# Patient Record
Sex: FEMALE | Race: WHITE | NOT HISPANIC OR LATINO | URBAN - METROPOLITAN AREA
[De-identification: names, ages, dates, MRNs, and addresses within clinical notes are randomized per-mention and may not be internally consistent; named-entity substitution may affect disease eponyms.]

---

## 2021-12-28 ENCOUNTER — APPOINTMENT (RX ONLY)
Dept: URBAN - METROPOLITAN AREA CLINIC 126 | Facility: CLINIC | Age: 56
Setting detail: DERMATOLOGY
End: 2021-12-28

## 2021-12-28 DIAGNOSIS — Z41.9 ENCOUNTER FOR PROCEDURE FOR PURPOSES OTHER THAN REMEDYING HEALTH STATE, UNSPECIFIED: ICD-10-CM

## 2021-12-28 PROCEDURE — ? DERMAPLANE

## 2021-12-28 PROCEDURE — ? HYDRAFACIAL

## 2021-12-28 ASSESSMENT — LOCATION DETAILED DESCRIPTION DERM
LOCATION DETAILED: LEFT MEDIAL FOREHEAD
LOCATION DETAILED: RIGHT MEDIAL FOREHEAD

## 2021-12-28 ASSESSMENT — LOCATION ZONE DERM: LOCATION ZONE: FACE

## 2021-12-28 ASSESSMENT — LOCATION SIMPLE DESCRIPTION DERM
LOCATION SIMPLE: LEFT FOREHEAD
LOCATION SIMPLE: RIGHT FOREHEAD

## 2021-12-28 NOTE — PROCEDURE: DERMAPLANE
Post-Procedure Instructions: Following the dermaplane procedure, Oxymist treatment was applied to the treatment areas. Moisturizer and SPF was applied.
Blade: [place] scalpel
Treatment Area Prep: acetone
Post-Care Instructions: I reviewed with the patient in detail post-care instructions.
Treatment Areas: face and neck
Pre-Procedure Text: The patient was placed in a recumbant position on the procedure table.
Detail Level: Zone

## 2021-12-28 NOTE — PROCEDURE: HYDRAFACIAL
Procedure: Peel
Tip: Hydropeel Tip, Clear
Procedure: Exfoliation
Number Of Passes: 0
Location: face
Procedure: Extraction
Tip Override
Procedure: Fusion
Solution: GlySal 7.5%
Procedure: Boost
Solution: Activ-4
Procedure: Extend and Protect
Solution: Beta-HD
Indication: skin texture
Solution Override
Tip: Hydropeel Tip, Blue
Consent: Written consent obtained, risks reviewed including but not limited to crusting, scabbing, blistering, scarring, darker or lighter pigmentary change, bruising, and/or incomplete response.
Treatment Number: 1
Tip: Hydropeel Tip, Teal
Post-Care Instructions: I reviewed with the patient in detail post-care instructions. Patient should stay away from the sun and wear sun protection until treated areas are fully healed.

## 2022-01-05 ENCOUNTER — APPOINTMENT (RX ONLY)
Dept: URBAN - METROPOLITAN AREA CLINIC 126 | Facility: CLINIC | Age: 57
Setting detail: DERMATOLOGY
End: 2022-01-05

## 2022-01-05 DIAGNOSIS — Z41.9 ENCOUNTER FOR PROCEDURE FOR PURPOSES OTHER THAN REMEDYING HEALTH STATE, UNSPECIFIED: ICD-10-CM

## 2022-01-05 PROCEDURE — ? MEDICAL CONSULTATION: BOTOX

## 2022-01-05 PROCEDURE — ? MEDICAL CONSULTATION: BBL

## 2022-01-05 PROCEDURE — ? COSMETIC CONSULTATION: MICRONEEDLING

## 2022-01-05 PROCEDURE — ? COSMETIC CONSULTATION: SKIN CARE PRODUCTS AND SERVICES

## 2022-01-05 PROCEDURE — ? COSMETIC CONSULTATION: FILLERS

## 2022-01-05 ASSESSMENT — LOCATION SIMPLE DESCRIPTION DERM
LOCATION SIMPLE: RIGHT LIP
LOCATION SIMPLE: RIGHT CHEEK
LOCATION SIMPLE: GLABELLA
LOCATION SIMPLE: LEFT CHEEK

## 2022-01-05 ASSESSMENT — LOCATION DETAILED DESCRIPTION DERM
LOCATION DETAILED: LEFT INFERIOR MEDIAL MALAR CHEEK
LOCATION DETAILED: RIGHT INFERIOR CENTRAL MALAR CHEEK
LOCATION DETAILED: GLABELLA
LOCATION DETAILED: RIGHT SUPERIOR VERMILION LIP

## 2022-01-05 ASSESSMENT — LOCATION ZONE DERM
LOCATION ZONE: LIP
LOCATION ZONE: FACE

## 2022-01-11 ENCOUNTER — APPOINTMENT (RX ONLY)
Dept: URBAN - METROPOLITAN AREA CLINIC 126 | Facility: CLINIC | Age: 57
Setting detail: DERMATOLOGY
End: 2022-01-11

## 2022-01-11 DIAGNOSIS — Z41.9 ENCOUNTER FOR PROCEDURE FOR PURPOSES OTHER THAN REMEDYING HEALTH STATE, UNSPECIFIED: ICD-10-CM

## 2022-01-11 PROCEDURE — ? BOTOX

## 2022-01-11 NOTE — PROCEDURE: BOTOX
Additional Area 2 Location: parentheses
Anterior Platysmal Bands Units: 0
Expiration Date (Month Year): 8/2023
Show Additional Area 4: Yes
Additional Area 5 Location: upper lip lines
Consent: Written consent obtained. Risks include but not limited to lid/brow ptosis, bruising, swelling, diplopia, temporary effect, incomplete chemical denervation.
Lot #: [id number]
Forehead Units: 10
Glabellar Complex Units: 25
Additional Area 4 Location: [facility]
Show Right And Left Pupillary Line Units: No
Additional Area 1 Location: brow lift
Detail Level: Simple
Dilution (U/0.1 Cc): 1
Additional Area 6 Location: chin
Periorbital Skin Units: 24
Additional Area 3 Location: bunnies
Post-Care Instructions: Patient instructed to not lie down for 4 hours and limit physical activity for 24 hours. Patient instructed not to travel by airplane for 48 hours.

## 2022-01-26 ENCOUNTER — APPOINTMENT (RX ONLY)
Dept: URBAN - METROPOLITAN AREA CLINIC 126 | Facility: CLINIC | Age: 57
Setting detail: DERMATOLOGY
End: 2022-01-26

## 2022-01-26 DIAGNOSIS — Z41.9 ENCOUNTER FOR PROCEDURE FOR PURPOSES OTHER THAN REMEDYING HEALTH STATE, UNSPECIFIED: ICD-10-CM

## 2022-01-26 PROCEDURE — ? COSMETIC FOLLOW-UP

## 2022-01-26 NOTE — PROCEDURE: COSMETIC FOLLOW-UP
Treatment (Optional): Botox
Patient Satisfaction: Very pleased
Detail Level: Zone
Comments (Free Text): Patient is very happy with results.
Global Improvement: Marked
MINISTERIO Hoyos

## 2022-01-26 NOTE — HPI: COSMETIC FOLLOW UP
How Did You Tolerate The Procedure?: well, without problems
What Condition Are We Treating?: wrinkles
What Procedure Did We Perform At The Last Visit?: Botox

## 2022-03-14 ENCOUNTER — APPOINTMENT (RX ONLY)
Dept: URBAN - METROPOLITAN AREA CLINIC 126 | Facility: CLINIC | Age: 57
Setting detail: DERMATOLOGY
End: 2022-03-14

## 2022-03-14 DIAGNOSIS — Z41.9 ENCOUNTER FOR PROCEDURE FOR PURPOSES OTHER THAN REMEDYING HEALTH STATE, UNSPECIFIED: ICD-10-CM

## 2022-03-14 PROCEDURE — ? HYDRAFACIAL

## 2022-03-14 PROCEDURE — ? DERMAPLANE

## 2022-03-14 ASSESSMENT — LOCATION DETAILED DESCRIPTION DERM
LOCATION DETAILED: RIGHT MEDIAL FOREHEAD
LOCATION DETAILED: LEFT INFERIOR MEDIAL FOREHEAD

## 2022-03-14 ASSESSMENT — LOCATION SIMPLE DESCRIPTION DERM
LOCATION SIMPLE: LEFT FOREHEAD
LOCATION SIMPLE: RIGHT FOREHEAD

## 2022-03-14 ASSESSMENT — LOCATION ZONE DERM: LOCATION ZONE: FACE

## 2022-03-14 NOTE — PROCEDURE: HYDRAFACIAL
Vacuum Pressure Low Setting (Will Not Render If Set To 0): 0
Procedure: Exfoliation
Consent: Written consent obtained, risks reviewed including but not limited to crusting, scabbing, blistering, scarring, darker or lighter pigmentary change, bruising, and/or incomplete response.
Procedure: Boost
Location: face
Solution Override
Tip: Hydropeel Tip, Teal
Solution: Activ-4
Post-Care Instructions: I reviewed with the patient in detail post-care instructions. Patient should stay away from the sun and wear sun protection until treated areas are fully healed.  Patient purchased phloretin, glycolic 10 and simply clean
Tip: Hydropeel Tip, Clear
Procedure: Peel
Procedure: Extraction
Procedure: Fusion
Treatment Number: 1
Indication: skin texture
Solution: GlySal 7.5%
Tip Override
Solution: Beta-HD
Tip: Hydropeel Tip, Blue
Procedure: Extend and Protect

## 2022-03-14 NOTE — PROCEDURE: DERMAPLANE
Treatment Areas: face and neck
Pre-Procedure Text: The patient was placed in a recumbant position on the procedure table.
Post-Procedure Instructions: Following the dermaplane procedure, Oxymist treatment was applied to the treatment areas. Moisturizer and SPF was applied.
Treatment Area Prep: acetone
Blade: [place] scalpel
Detail Level: Zone
Post-Care Instructions: I reviewed with the patient in detail post-care instructions.

## 2022-04-25 ENCOUNTER — APPOINTMENT (RX ONLY)
Dept: URBAN - METROPOLITAN AREA CLINIC 126 | Facility: CLINIC | Age: 57
Setting detail: DERMATOLOGY
End: 2022-04-25

## 2022-04-25 DIAGNOSIS — Z41.9 ENCOUNTER FOR PROCEDURE FOR PURPOSES OTHER THAN REMEDYING HEALTH STATE, UNSPECIFIED: ICD-10-CM

## 2022-04-25 DIAGNOSIS — B00.1 HERPESVIRAL VESICULAR DERMATITIS: ICD-10-CM

## 2022-04-25 PROCEDURE — ? HYDRAFACIAL

## 2022-04-25 PROCEDURE — ? PRESCRIPTION

## 2022-04-25 PROCEDURE — ? BOTOX

## 2022-04-25 PROCEDURE — ? COUNSELING

## 2022-04-25 PROCEDURE — 99213 OFFICE O/P EST LOW 20 MIN: CPT | Mod: NC

## 2022-04-25 PROCEDURE — ? DERMAPLANE

## 2022-04-25 RX ORDER — VALACYCLOVIR HYDROCHLORIDE 500 MG/1
TABLET, FILM COATED ORAL
Qty: 10 | Refills: 6 | Status: ERX | COMMUNITY
Start: 2022-04-25

## 2022-04-25 RX ADMIN — VALACYCLOVIR HYDROCHLORIDE: 500 TABLET, FILM COATED ORAL at 00:00

## 2022-04-25 ASSESSMENT — LOCATION SIMPLE DESCRIPTION DERM: LOCATION SIMPLE: LEFT CHEEK

## 2022-04-25 ASSESSMENT — LOCATION ZONE DERM: LOCATION ZONE: FACE

## 2022-04-25 ASSESSMENT — LOCATION DETAILED DESCRIPTION DERM: LOCATION DETAILED: LEFT INFERIOR CENTRAL MALAR CHEEK

## 2022-04-25 NOTE — PROCEDURE: BOTOX
Inferior Lateral Orbicularis Oculi Units: 0
Periorbital Skin Units: 24
Show Nasal Units: Yes
Post-Care Instructions: Patient instructed to not lie down for 4 hours and limit physical activity for 24 hours. Patient instructed not to travel by airplane for 48 hours.
Additional Area 5 Location: upper lip lines
Additional Area 2 Location: parentheses
Show Right And Left Pupillary Line Units: No
Additional Area 5 Units: 4
Glabellar Complex Units: [facility]
Additional Area 3 Location: bunnies
Additional Area 6 Location: [facility]
Expiration Date (Month Year): 5/2024
Additional Area 3 Units: 8
Detail Level: Simple
Lot #: [id number]
Additional Area 4 Location: upper forehead
Forehead Units: 10
Additional Area 1 Location: brow lift
Consent: Written consent obtained. Risks include but not limited to lid/brow ptosis, bruising, swelling, diplopia, temporary effect, incomplete chemical denervation.
Dilution (U/0.1 Cc): 1
Show Inventory Tab: Hide

## 2022-04-25 NOTE — PROCEDURE: DERMAPLANE
Pre-Procedure Text: The patient was placed in a recumbant position on the procedure table.
Treatment Area Prep: alcohol
Treatment Areas: face
Blade: [place] scalpel
Detail Level: Zone
Post-Procedure Instructions: Following the dermaplane procedure, Oxymist treatment was applied to the treatment areas. Moisturizer and SPF was applied.
Post-Care Instructions: I reviewed with the patient in detail post-care instructions.

## 2022-04-25 NOTE — PROCEDURE: HYDRAFACIAL
Tip: Hydropeel Tip, Teal
Solution Override
Number Of Passes: 0
Tip: Hydropeel Tip, Clear
Procedure: Peel
Solution: Beta-HD
Procedure: Fusion
Treatment Number: 1
Procedure: Boost
Indication: skin texture
Solution: GlySal 7.5%
Tip: Hydropeel Tip, Blue
Tip Override
Procedure: Exfoliation
Consent: Written consent obtained, risks reviewed including but not limited to crusting, scabbing, blistering, scarring, darker or lighter pigmentary change, bruising, and/or incomplete response.
Location: face
Post-Care Instructions: I reviewed with the patient in detail post-care instructions. Patient should stay away from the sun and wear sun protection until treated areas are fully healed.
Procedure: Extend and Protect
Procedure: Extraction
Solution: Activ-4